# Patient Record
Sex: MALE | Race: WHITE | URBAN - METROPOLITAN AREA
[De-identification: names, ages, dates, MRNs, and addresses within clinical notes are randomized per-mention and may not be internally consistent; named-entity substitution may affect disease eponyms.]

---
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
## 2024-03-16 NOTE — TELEPHONE ENCOUNTER
Spoke with patients mother, scheduled 2/16/22. 1. The severity of signs/symptoms.(See ED/admit documents)

## 2024-05-21 ENCOUNTER — OFFICE VISIT (OUTPATIENT)
Dept: DERMATOLOGY | Age: 19
End: 2024-05-21
Payer: COMMERCIAL

## 2024-05-21 DIAGNOSIS — D22.9 BENIGN NEVUS: Primary | ICD-10-CM

## 2024-05-21 DIAGNOSIS — L57.8 DIFFUSE PHOTODAMAGE OF SKIN: ICD-10-CM

## 2024-05-21 DIAGNOSIS — Z86.018 HISTORY OF DYSPLASTIC NEVUS: ICD-10-CM

## 2024-05-21 PROCEDURE — 99213 OFFICE O/P EST LOW 20 MIN: CPT | Performed by: DERMATOLOGY

## 2024-05-21 NOTE — PROGRESS NOTES
[facility] Dermatology  Sabine Hatch M.D.  [phone]       [address]  2005    19 y.o. male     Date of Visit: 5/21/2024    Chief Complaint:   Chief Complaint   Patient presents with    Lesion(s)     Middle of back        I was asked to see this patient by Dr. Elise ref. provider found.    History of Present Illness:  1. TBSE    Multiple nevi.  Patient has been monitoring his back-history of dysplastic nevus.  Notes that several nevi have become more raised.  No discrete lesion itching, bleeding.    Photodamage-blistering sunburn both shoulders.  Was at his brother's [place]de[place] party in [place].  Was wearing a hat.      Skin history:  4/21-started minocycline, initially 100 mg p.o. twice daily, then 100 mg p.o. daily, also on tretinoin 0.05% cream.  Discontinued both after his appointment 2/22.  Using only Differin gel 12/22     Has a history of dysplastic nevus with mild dysplasia extending to the margins removed 7/21/2021 right shoulder.      + Family history of nonmelanoma skin cancer     Going to college at [employer]-started this fall    Review of Systems:  Constitutional: Reports general sense of well-being       Past Medical History, Surgical History, Family History, Medications and Allergies reviewed.    Social History:   Social History     Socioeconomic History    Marital status: Single     Spouse name: Not on file    Number of children: Not on file    Years of education: Not on file    Highest education level: Not on file   Occupational History    Not on file   Tobacco Use    Smoking status: Never    Smokeless tobacco: Never   Vaping Use    Vaping Use: Never used   Substance and Sexual Activity    Alcohol use: Not on file    Drug use: Not on file    Sexual activity: Not on file   Other Topics Concern    Not on file   Social History Narrative    Not on file     Social Determinants of Health     Financial Resource Strain: Not on file   Food Insecurity: Not on file   Transportation

## 2024-08-20 ENCOUNTER — OFFICE VISIT (OUTPATIENT)
Dept: INTERNAL MEDICINE CLINIC | Age: 19
End: 2024-08-20
Payer: COMMERCIAL

## 2024-08-20 VITALS
HEART RATE: 58 BPM | SYSTOLIC BLOOD PRESSURE: 116 MMHG | OXYGEN SATURATION: 99 % | WEIGHT: 165.2 LBS | HEIGHT: 74 IN | BODY MASS INDEX: 21.2 KG/M2 | DIASTOLIC BLOOD PRESSURE: 78 MMHG

## 2024-08-20 DIAGNOSIS — Z13.220 SCREENING FOR CHOLESTEROL LEVEL: ICD-10-CM

## 2024-08-20 DIAGNOSIS — Z00.00 PREVENTATIVE HEALTH CARE: ICD-10-CM

## 2024-08-20 DIAGNOSIS — J30.1 SEASONAL ALLERGIC RHINITIS DUE TO POLLEN: ICD-10-CM

## 2024-08-20 DIAGNOSIS — Z00.00 PREVENTATIVE HEALTH CARE: Primary | ICD-10-CM

## 2024-08-20 LAB
ANION GAP SERPL CALCULATED.3IONS-SCNC: 11 MMOL/L (ref 3–16)
BUN SERPL-MCNC: 22 MG/DL (ref 7–20)
CALCIUM SERPL-MCNC: 9 MG/DL (ref 8.3–10.6)
CHLORIDE SERPL-SCNC: 102 MMOL/L (ref 99–110)
CHOLEST SERPL-MCNC: 157 MG/DL (ref 0–199)
CO2 SERPL-SCNC: 26 MMOL/L (ref 21–32)
CREAT SERPL-MCNC: 0.9 MG/DL (ref 0.9–1.3)
GFR SERPLBLD CREATININE-BSD FMLA CKD-EPI: >90 ML/MIN/{1.73_M2}
GLUCOSE SERPL-MCNC: 79 MG/DL (ref 70–99)
HDLC SERPL-MCNC: 60 MG/DL (ref 40–60)
LDLC SERPL CALC-MCNC: 78 MG/DL
POTASSIUM SERPL-SCNC: 3.9 MMOL/L (ref 3.5–5.1)
SODIUM SERPL-SCNC: 139 MMOL/L (ref 136–145)
TRIGL SERPL-MCNC: 97 MG/DL (ref 0–150)
VLDLC SERPL CALC-MCNC: 19 MG/DL

## 2024-08-20 PROCEDURE — 99385 PREV VISIT NEW AGE 18-39: CPT | Performed by: INTERNAL MEDICINE

## 2024-08-20 RX ORDER — FLUTICASONE PROPIONATE 50 MCG
1 SPRAY, SUSPENSION (ML) NASAL DAILY
Qty: 16 G | Refills: 0 | Status: SHIPPED | OUTPATIENT
Start: 2024-08-20

## 2024-08-20 RX ORDER — LORATADINE 10 MG/1
10 TABLET ORAL DAILY
Qty: 90 TABLET | Refills: 1 | Status: SHIPPED | OUTPATIENT
Start: 2024-08-20

## 2024-08-20 SDOH — ECONOMIC STABILITY: FOOD INSECURITY: WITHIN THE PAST 12 MONTHS, YOU WORRIED THAT YOUR FOOD WOULD RUN OUT BEFORE YOU GOT MONEY TO BUY MORE.: NEVER TRUE

## 2024-08-20 SDOH — ECONOMIC STABILITY: FOOD INSECURITY: WITHIN THE PAST 12 MONTHS, THE FOOD YOU BOUGHT JUST DIDN'T LAST AND YOU DIDN'T HAVE MONEY TO GET MORE.: NEVER TRUE

## 2024-08-20 SDOH — ECONOMIC STABILITY: INCOME INSECURITY: HOW HARD IS IT FOR YOU TO PAY FOR THE VERY BASICS LIKE FOOD, HOUSING, MEDICAL CARE, AND HEATING?: NOT HARD AT ALL

## 2024-08-20 ASSESSMENT — PATIENT HEALTH QUESTIONNAIRE - PHQ9
1. LITTLE INTEREST OR PLEASURE IN DOING THINGS: NOT AT ALL
2. FEELING DOWN, DEPRESSED OR HOPELESS: NOT AT ALL
SUM OF ALL RESPONSES TO PHQ9 QUESTIONS 1 & 2: 0
SUM OF ALL RESPONSES TO PHQ QUESTIONS 1-9: 0

## 2024-08-20 ASSESSMENT — ENCOUNTER SYMPTOMS
BLOOD IN STOOL: 0
CONSTIPATION: 0
SHORTNESS OF BREATH: 0
COLOR CHANGE: 0
WHEEZING: 0
SINUS PAIN: 0
CHEST TIGHTNESS: 0
ABDOMINAL PAIN: 0
COUGH: 0

## 2024-08-20 NOTE — PROGRESS NOTES
Black Dyer (:  2005) is a 19 y.o. male,New patient, here for evaluation of the following chief complaint(s):  New Patient      Assessment & Plan   ASSESSMENT/PLAN:  1. Preventative health care  -     Basic Metabolic Panel; Future  2. Screening for cholesterol level  -     Lipid Panel; Future  3. Seasonal allergic rhinitis due to pollen  -     loratadine (CLARITIN) 10 MG tablet; Take 1 tablet by mouth daily, Disp-90 tablet, R-1Normal  -     fluticasone (FLONASE) 50 MCG/ACT nasal spray; 1 spray by Each Nostril route daily, Disp-16 g, R-0Normal    Overall a very young healthy man he is having some cough that is been going on for the last month and a half he does sound to have a lot of allergies he is recommended to use Claritin as well as Flonase on daily basis for 6 weeks and see if that will clear up his coughing if not other causes for cough should be considered including asthma, acid reflux disease and chronic sinusitis.    Patient will have his blood sugar and cholesterol checked today otherwise he is advised to stay active avoid drinking and driving avoid using any drugs and to practice safe sexual activities and we will have him follow-up in about  Return in about 1 year (around 2025).         Subjective   SUBJECTIVE/OBJECTIVE:    Lab Review   No results found for: \"NA\", \"K\", \"CO2\", \"BUN\", \"CREATININE\", \"GLUCOSE\", \"CALCIUM\"  No results found for: \"WBC\", \"HGB\", \"HCT\", \"MCV\", \"PLT\"  No results found for: \"CHOL\", \"TRIG\", \"HDL\", \"LDLDIRECT\"        2024     9:32 AM 2022     8:46 AM 2021    12:58 PM   Vitals   SYSTOLIC 116     DIASTOLIC 78     Pulse 58     Temp  97.3 °F (36.3 °C) 97.8 °F (36.6 °C)   SpO2 99 %     Weight - Scale 165 lb 3.2 oz  150 lb 3.2 oz   Height 6' 2\"     Body Mass Index 21.21 kg/m2         For check up and exam        Review of Systems   Constitutional:  Negative for activity change, appetite change, fatigue and unexpected weight change.   HENT:  Negative for

## 2024-09-03 DIAGNOSIS — J30.1 SEASONAL ALLERGIC RHINITIS DUE TO POLLEN: ICD-10-CM

## 2024-09-04 RX ORDER — FLUTICASONE PROPIONATE 50 MCG
SPRAY, SUSPENSION (ML) NASAL
Qty: 48 G | Refills: 1 | Status: SHIPPED | OUTPATIENT
Start: 2024-09-04

## 2024-09-18 DIAGNOSIS — J30.1 SEASONAL ALLERGIC RHINITIS DUE TO POLLEN: ICD-10-CM

## 2024-09-19 RX ORDER — FLUTICASONE PROPIONATE 50 MCG
SPRAY, SUSPENSION (ML) NASAL
Qty: 1 EACH | Refills: 2 | Status: SHIPPED | OUTPATIENT
Start: 2024-09-19

## 2025-07-08 ENCOUNTER — OFFICE VISIT (OUTPATIENT)
Age: 20
End: 2025-07-08
Payer: COMMERCIAL

## 2025-07-08 DIAGNOSIS — D22.9 BENIGN NEVUS: Primary | ICD-10-CM

## 2025-07-08 DIAGNOSIS — L57.8 DIFFUSE PHOTODAMAGE OF SKIN: ICD-10-CM

## 2025-07-08 DIAGNOSIS — Z86.018 HISTORY OF DYSPLASTIC NEVUS: ICD-10-CM

## 2025-07-08 DIAGNOSIS — D22.9 HALO NEVUS: ICD-10-CM

## 2025-07-08 PROCEDURE — 99213 OFFICE O/P EST LOW 20 MIN: CPT | Performed by: DERMATOLOGY

## 2025-07-08 NOTE — PROGRESS NOTES
[facility]  Sabine Hatch M.D.  [phone]       Black Dyer  2005    20 y.o. male     Date of Visit: 7/8/2025    Chief Complaint:   Chief Complaint   Patient presents with    Skin Lesion        I was asked to see this patient by Dr. Elise ref. provider found.    History of Present Illness:  1. TBSE    Multiple nevi. Patient has not noticed any new or changing pigmented lesions.   Stable in size, shape, color. Not itching, bleeding.     Has noted a new white area around a mole on his left back.  Noticed this about a month ago.  Uncertain how long it has been there.  Has not noticed any irregular nevi or any other areas of depigmentation.    Recent sunburn on his thighs while kayaking.  Does wear hats and sunscreen.  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 twice daily, then 100 mg p.o. daily, also on tretinoin 0.05% cream.  Discontinued both after his appointment 2/22.  Using only Differin gel 12/22     Has a history of dysplastic nevus with mild dysplasia extending to the margins removed 7/21/2021 right shoulder.      + Family history of nonmelanoma skin cancer         Review of Systems:  Constitutional: Reports general sense of well-being       Past Medical History, Surgical History, Family History, Medications and Allergies reviewed.    Social History:   Social History     Socioeconomic History    Marital status: Single     Spouse name: Not on file    Number of children: Not on file    Years of education: Not on file    Highest education level: Not on file   Occupational History    Not on file   Tobacco Use    Smoking status: Never    Smokeless tobacco: Never   Vaping Use    Vaping status: Never Used   Substance and Sexual Activity    Alcohol use: Not on file    Drug use: Not on file    Sexual activity: Not on file   Other Topics Concern    Not on file   Social History Narrative